# Patient Record
Sex: FEMALE | Race: OTHER | HISPANIC OR LATINO | Employment: UNEMPLOYED | URBAN - METROPOLITAN AREA
[De-identification: names, ages, dates, MRNs, and addresses within clinical notes are randomized per-mention and may not be internally consistent; named-entity substitution may affect disease eponyms.]

---

## 2022-03-23 ENCOUNTER — APPOINTMENT (EMERGENCY)
Dept: RADIOLOGY | Facility: HOSPITAL | Age: 67
End: 2022-03-23

## 2022-03-23 ENCOUNTER — APPOINTMENT (EMERGENCY)
Dept: CT IMAGING | Facility: HOSPITAL | Age: 67
End: 2022-03-23

## 2022-03-23 ENCOUNTER — HOSPITAL ENCOUNTER (EMERGENCY)
Facility: HOSPITAL | Age: 67
Discharge: HOME | End: 2022-03-24
Attending: EMERGENCY MEDICINE | Admitting: EMERGENCY MEDICINE

## 2022-03-23 VITALS
DIASTOLIC BLOOD PRESSURE: 84 MMHG | TEMPERATURE: 98.3 F | OXYGEN SATURATION: 99 % | SYSTOLIC BLOOD PRESSURE: 143 MMHG | WEIGHT: 174.38 LBS | HEART RATE: 57 BPM | RESPIRATION RATE: 16 BRPM

## 2022-03-23 DIAGNOSIS — M54.2 NECK PAIN: Primary | ICD-10-CM

## 2022-03-23 DIAGNOSIS — M54.9 BACK PAIN: ICD-10-CM

## 2022-03-23 DIAGNOSIS — I10 HYPERTENSION: ICD-10-CM

## 2022-03-23 LAB
ALBUMIN SERPL BCP-MCNC: 4.6 G/DL (ref 3–5.2)
ALP SERPL-CCNC: 119 U/L (ref 43–122)
ALT SERPL W P-5'-P-CCNC: 13 U/L
ANION GAP SERPL CALCULATED.3IONS-SCNC: 6 MMOL/L (ref 5–14)
AST SERPL W P-5'-P-CCNC: 27 U/L (ref 14–36)
BASOPHILS # BLD AUTO: 0 THOUSANDS/ΜL (ref 0–0.1)
BASOPHILS NFR BLD AUTO: 1 % (ref 0–1)
BILIRUB SERPL-MCNC: 0.48 MG/DL
BILIRUB UR QL STRIP: NEGATIVE
BUN SERPL-MCNC: 9 MG/DL (ref 5–25)
CALCIUM SERPL-MCNC: 9.2 MG/DL (ref 8.4–10.2)
CARDIAC TROPONIN I PNL SERPL HS: 3 NG/L
CHLORIDE SERPL-SCNC: 104 MMOL/L (ref 97–108)
CLARITY UR: CLEAR
CO2 SERPL-SCNC: 32 MMOL/L (ref 22–30)
COLOR UR: NORMAL
CREAT SERPL-MCNC: 0.8 MG/DL (ref 0.6–1.2)
EOSINOPHIL # BLD AUTO: 0.1 THOUSAND/ΜL (ref 0–0.4)
EOSINOPHIL NFR BLD AUTO: 3 % (ref 0–6)
ERYTHROCYTE [DISTWIDTH] IN BLOOD BY AUTOMATED COUNT: 13 %
GFR SERPL CREATININE-BSD FRML MDRD: 77 ML/MIN/1.73SQ M
GLUCOSE SERPL-MCNC: 124 MG/DL (ref 70–99)
GLUCOSE UR STRIP-MCNC: NEGATIVE MG/DL
HCT VFR BLD AUTO: 37.1 % (ref 36–46)
HGB BLD-MCNC: 12.6 G/DL (ref 12–16)
HGB UR QL STRIP.AUTO: NEGATIVE
KETONES UR STRIP-MCNC: NEGATIVE MG/DL
LEUKOCYTE ESTERASE UR QL STRIP: NEGATIVE
LYMPHOCYTES # BLD AUTO: 2.4 THOUSANDS/ΜL (ref 0.5–4)
LYMPHOCYTES NFR BLD AUTO: 51 % (ref 25–45)
MCH RBC QN AUTO: 30.4 PG (ref 26–34)
MCHC RBC AUTO-ENTMCNC: 34 G/DL (ref 31–36)
MCV RBC AUTO: 89 FL (ref 80–100)
MONOCYTES # BLD AUTO: 0.4 THOUSAND/ΜL (ref 0.2–0.9)
MONOCYTES NFR BLD AUTO: 8 % (ref 1–10)
NEUTROPHILS # BLD AUTO: 1.7 THOUSANDS/ΜL (ref 1.8–7.8)
NEUTS SEG NFR BLD AUTO: 37 % (ref 45–65)
NITRITE UR QL STRIP: NEGATIVE
NT-PROBNP SERPL-MCNC: 67 PG/ML (ref 0–299)
PH UR STRIP.AUTO: 7 [PH]
PLATELET # BLD AUTO: 387 THOUSANDS/UL (ref 150–450)
PMV BLD AUTO: 8.2 FL (ref 8.9–12.7)
POTASSIUM SERPL-SCNC: 3.7 MMOL/L (ref 3.6–5)
PROT SERPL-MCNC: 8.6 G/DL (ref 5.9–8.4)
PROT UR STRIP-MCNC: NEGATIVE MG/DL
RBC # BLD AUTO: 4.15 MILLION/UL (ref 4–5.2)
SODIUM SERPL-SCNC: 142 MMOL/L (ref 137–147)
SP GR UR STRIP.AUTO: 1.01 (ref 1–1.04)
UROBILINOGEN UA: NEGATIVE MG/DL
WBC # BLD AUTO: 4.7 THOUSAND/UL (ref 4.5–11)

## 2022-03-23 PROCEDURE — G1004 CDSM NDSC: HCPCS

## 2022-03-23 PROCEDURE — 81003 URINALYSIS AUTO W/O SCOPE: CPT

## 2022-03-23 PROCEDURE — 71045 X-RAY EXAM CHEST 1 VIEW: CPT

## 2022-03-23 PROCEDURE — 84484 ASSAY OF TROPONIN QUANT: CPT

## 2022-03-23 PROCEDURE — 70450 CT HEAD/BRAIN W/O DYE: CPT

## 2022-03-23 PROCEDURE — 96374 THER/PROPH/DIAG INJ IV PUSH: CPT

## 2022-03-23 PROCEDURE — 99284 EMERGENCY DEPT VISIT MOD MDM: CPT

## 2022-03-23 PROCEDURE — 93005 ELECTROCARDIOGRAM TRACING: CPT

## 2022-03-23 PROCEDURE — 83880 ASSAY OF NATRIURETIC PEPTIDE: CPT

## 2022-03-23 PROCEDURE — 36415 COLL VENOUS BLD VENIPUNCTURE: CPT

## 2022-03-23 PROCEDURE — 85025 COMPLETE CBC W/AUTO DIFF WBC: CPT

## 2022-03-23 PROCEDURE — 80053 COMPREHEN METABOLIC PANEL: CPT

## 2022-03-23 RX ORDER — KETOROLAC TROMETHAMINE 30 MG/ML
15 INJECTION, SOLUTION INTRAMUSCULAR; INTRAVENOUS ONCE
Status: COMPLETED | OUTPATIENT
Start: 2022-03-23 | End: 2022-03-23

## 2022-03-23 RX ADMIN — KETOROLAC TROMETHAMINE 15 MG: 30 INJECTION, SOLUTION INTRAMUSCULAR; INTRAVENOUS at 22:42

## 2022-03-24 LAB
ATRIAL RATE: 64 BPM
ATRIAL RATE: 67 BPM
P AXIS: 40 DEGREES
P AXIS: 60 DEGREES
PR INTERVAL: 160 MS
PR INTERVAL: 182 MS
QRS AXIS: -39 DEGREES
QRS AXIS: -41 DEGREES
QRSD INTERVAL: 82 MS
QRSD INTERVAL: 88 MS
QT INTERVAL: 416 MS
QT INTERVAL: 428 MS
QTC INTERVAL: 429 MS
QTC INTERVAL: 452 MS
T WAVE AXIS: 43 DEGREES
T WAVE AXIS: 56 DEGREES
VENTRICULAR RATE: 64 BPM
VENTRICULAR RATE: 67 BPM

## 2022-03-24 PROCEDURE — 93010 ELECTROCARDIOGRAM REPORT: CPT | Performed by: INTERNAL MEDICINE

## 2022-03-24 NOTE — ED NOTES
Patient resting quietly upon entering room   Reports that she is feeling better       Arabella Ramirez RN  03/23/22 1978

## 2022-03-24 NOTE — ED PROVIDER NOTES
History  Chief Complaint   Patient presents with    Neck Pain     Pt states (via ) that her neck and left arm started hurting 5 days ago  No known cause   Headache     Patient is a 60-year-old female with a past medical history of PVD and hypertension for 5 days of neck, left arm, and left flank pain  Patient is in no acute distress at this time  Patient denies chest pain, shortness of breath, nausea, vomiting, dysuria, or any other symptoms    Patient's right foot, appears to be black, most likely due to PVD  Patient and patient's daughter states that this is her normal      History provided by:  Patient   used: No    Neck Pain  Pain location:  L side  Pain severity:  Mild  Duration:  5 days  Associated symptoms: headaches    Associated symptoms: no bladder incontinence, no bowel incontinence, no chest pain, no fever, no leg pain, no paresis, no photophobia, no syncope, no visual change, no weakness and no weight loss    Headaches:     Severity:  Mild    Duration:  5 days    Timing:  Constant  Headache  Associated symptoms: back pain and neck pain    Associated symptoms: no dizziness, no ear pain, no fever, no photophobia, no syncope, no visual change and no weakness        None       Past Medical History:   Diagnosis Date    Hypertension     Stomach ulcer        Past Surgical History:   Procedure Laterality Date    GALLBLADDER SURGERY      HYSTERECTOMY      VARICOSE VEIN SURGERY         History reviewed  No pertinent family history  I have reviewed and agree with the history as documented  E-Cigarette/Vaping     E-Cigarette/Vaping Substances     Social History     Tobacco Use    Smoking status: Never Smoker    Smokeless tobacco: Never Used   Substance Use Topics    Alcohol use: Never    Drug use: Never       Review of Systems   Constitutional: Negative  Negative for activity change, appetite change, fever and weight loss  HENT: Negative    Negative for ear pain     Eyes: Negative  Negative for photophobia  Respiratory: Negative  Negative for chest tightness and shortness of breath  Cardiovascular: Negative  Negative for chest pain and syncope  Gastrointestinal: Negative  Negative for bowel incontinence  Genitourinary: Negative  Negative for bladder incontinence  Musculoskeletal: Positive for back pain and neck pain  Skin: Negative  Neurological: Positive for headaches  Negative for dizziness and weakness  Psychiatric/Behavioral: Negative  Physical Exam  Physical Exam  Vitals reviewed  Constitutional:       Appearance: Normal appearance  She is normal weight  HENT:      Head: Normocephalic and atraumatic  Right Ear: External ear normal       Left Ear: External ear normal       Nose: Nose normal    Eyes:      General:         Right eye: No discharge  Left eye: No discharge  Conjunctiva/sclera: Conjunctivae normal    Cardiovascular:      Rate and Rhythm: Normal rate  Pulses: Normal pulses  Pulmonary:      Effort: Pulmonary effort is normal    Abdominal:      Palpations: Abdomen is soft  Tenderness: There is no abdominal tenderness  There is left CVA tenderness  There is no right CVA tenderness or guarding  Musculoskeletal:         General: Normal range of motion  Cervical back: Normal range of motion  Skin:     General: Skin is warm and dry  Capillary Refill: Capillary refill takes less than 2 seconds  Neurological:      Mental Status: She is alert           Vital Signs  ED Triage Vitals [03/23/22 2104]   Temperature Pulse Respirations Blood Pressure SpO2   98 3 °F (36 8 °C) 75 18 (!) 204/102 99 %      Temp Source Heart Rate Source Patient Position - Orthostatic VS BP Location FiO2 (%)   Oral Monitor Sitting Left arm --      Pain Score       --           Vitals:    03/23/22 2104 03/23/22 2109   BP: (!) 204/102 (!) 178/97   Pulse: 75    Patient Position - Orthostatic VS: Sitting          Visual Acuity      ED Medications  Medications   ketorolac (TORADOL) injection 15 mg (15 mg Intravenous Given 3/23/22 2242)       Diagnostic Studies  Results Reviewed     Procedure Component Value Units Date/Time    UA w Reflex to Microscopic w Reflex to Culture [[id number]]  (Normal) Collected: 03/23/22 2214    Lab Status: Final result Specimen: Urine, Clean Catch Updated: 03/23/22 2230     Color, UA Straw     Clarity, UA Clear     Specific Gravity, UA 1 015     pH, UA 7 0     Leukocytes, UA Negative     Nitrite, UA Negative     Protein, UA Negative mg/dl      Glucose, UA Negative mg/dl      Ketones, UA Negative mg/dl      Bilirubin, UA Negative     Blood, UA Negative     UROBILINOGEN UA Negative mg/dL     NT-BNP PRO [[id number]]  (Normal) Collected: 03/23/22 2140    Lab Status: Final result Specimen: Blood from Arm, Left Updated: 03/23/22 2222     NT-proBNP 67 0 pg/mL     HS Troponin 0hr (reflex protocol) [[id number]]  (Normal) Collected: 03/23/22 2140    Lab Status: Final result Specimen: Blood from Arm, Left Updated: 03/23/22 2220     hs TnI 0hr 3 ng/L     Comprehensive metabolic panel [[id number]]  (Abnormal) Collected: 03/23/22 2140    Lab Status: Final result Specimen: Blood from Arm, Left Updated: 03/23/22 2215     Sodium 142 mmol/L      Potassium 3 7 mmol/L      Chloride 104 mmol/L      CO2 32 mmol/L      ANION GAP 6 mmol/L      BUN 9 mg/dL      Creatinine 0 80 mg/dL      Glucose 124 mg/dL      Calcium 9 2 mg/dL      AST 27 U/L      ALT 13 U/L      Alkaline Phosphatase 119 U/L      Total Protein 8 6 g/dL      Albumin 4 6 g/dL      Total Bilirubin 0 48 mg/dL      eGFR 77 ml/min/1 73sq m     Narrative:      Justin guidelines for Chronic Kidney Disease (CKD):     Stage 1 with normal or high GFR (GFR > 90 mL/min/1 73 square meters)    Stage 2 Mild CKD (GFR = 60-89 mL/min/1 73 square meters)    Stage 3A Moderate CKD (GFR = 45-59 mL/min/1 73 square meters)    Stage 3B Moderate CKD (GFR = 30-44 mL/min/1 73 square meters)    Stage 4 Severe CKD (GFR = 15-29 mL/min/1 73 square meters)    Stage 5 End Stage CKD (GFR <15 mL/min/1 73 square meters)  Note: GFR calculation is accurate only with a steady state creatinine    CBC and differential [[id number]]  (Abnormal) Collected: 03/23/22 2140    Lab Status: Final result Specimen: Blood from Arm, Left Updated: 03/23/22 2153     WBC 4 70 Thousand/uL      RBC 4 15 Million/uL      Hemoglobin 12 6 g/dL      Hematocrit 37 1 %      MCV 89 fL      MCH 30 4 pg      MCHC 34 0 g/dL      RDW 13 0 %      MPV 8 2 fL      Platelets 426 Thousands/uL      Neutrophils Relative 37 %      Lymphocytes Relative 51 %      Monocytes Relative 8 %      Eosinophils Relative 3 %      Basophils Relative 1 %      Neutrophils Absolute 1 70 Thousands/µL      Lymphocytes Absolute 2 40 Thousands/µL      Monocytes Absolute 0 40 Thousand/µL      Eosinophils Absolute 0 10 Thousand/µL      Basophils Absolute 0 00 Thousands/µL                  CT head without contrast    (Results Pending)   XR chest 1 view portable    (Results Pending)              Procedures  Procedures         ED Course  ED Course as of 03/23/22 2246   Wed Mar 23, 2022   2215 Creatinine: 0 80   2221 hs TnI 0hr: 3   2224 NT-proBNP: 67 0                  Stroke Assessment     Row Name 03/23/22 2207             NIH Stroke Scale    Interval Baseline      Level of Consciousness (1a ) 0      LOC Questions (1b ) 0      LOC Commands (1c ) 0      Best Gaze (2 ) 0      Visual (3 ) 0      Facial Palsy (4 ) 0      Motor Arm, Left (5a ) 0      Motor Arm, Right (5b ) 0      Motor Leg, Left (6a ) 0      Motor Leg, Right (6b ) 0      Limb Ataxia (7 ) 0      Sensory (8 ) 0      Best Language (9 ) 0      Dysarthria (10 ) 0      Extinction and Inattention (11 ) (Formerly Neglect) 0      Total 0                            SBIRT 22yo+      Most Recent Value   SBIRT (23 yo +)    In order to provide better care to our patients, we are screening all of our patients for alcohol and drug use  Would it be okay to ask you these screening questions? Yes Filed at: 03/23/2022 2126   Initial Alcohol Screen: US AUDIT-C     1  How often do you have a drink containing alcohol? 0 Filed at: 03/23/2022 2126   2  How many drinks containing alcohol do you have on a typical day you are drinking? 0 Filed at: 03/23/2022 2126   3b  FEMALE Any Age, or MALE 65+: How often do you have 4 or more drinks on one occassion? 0 Filed at: 03/23/2022 2126   Audit-C Score 0 Filed at: 03/23/2022 2126   BARBARA: How many times in the past year have you    Used an illegal drug or used a prescription medication for non-medical reasons? Never Filed at: 03/23/2022 2126                    MDM  Number of Diagnoses or Management Options  Back pain: new and does not require workup  Hypertension: established and worsening  Neck pain: new and does not require workup  Diagnosis management comments: Patient is in no acute distress this time  Troponin was less than 5, and pain has been going on for 5 days  No sign of UTI at this time  BNP is less than 500  Awaiting results of CT, signed off to minerva Hall    Counseling: I had a detailed discussion with the patient and/or guardian regarding: the historical points, exam findings, and any diagnostic results supporting the discharge diagnosis, lab results, radiology results, discharge instructions reviewed with patient and/or family/caregiver and understanding was verbalized   Instructions given to return to the emergency department if symptoms worsen or persist, or if there are any questions or concerns that arise at home       All labs reviewed and utilized in the medical decision making process     All radiology studies independently viewed by me and interpreted by the radiologist     Portions of the record may have been created with voice recognition software   Occasional wrong word or "sound a like" substitutions may have occurred due to the inherent limitations of voice recognition software   Read the chart carefully and recognize, using context, where substitutions have occurred  Amount and/or Complexity of Data Reviewed  Clinical lab tests: ordered and reviewed  Tests in the radiology section of CPT®: ordered    Risk of Complications, Morbidity, and/or Mortality  Presenting problems: low  Diagnostic procedures: minimal  Management options: minimal    Patient Progress  Patient progress: stable      Disposition  Final diagnoses:   Neck pain   Back pain   Hypertension     Time reflects when diagnosis was documented in both MDM as applicable and the Disposition within this note     Time User Action Codes Description Comment    3/23/2022 10:38 PM Trinidad Furbish Add [M54 2] Neck pain     3/23/2022 10:38 PM Trinidad Furbish Add [M54 9] Back pain     3/23/2022 10:39 PM Trinidad Furbish Add [I10] Hypertension       ED Disposition     None      Follow-up Information     Follow up With Specialties Details Why Contact Info Additional Information    [facility] Emergency Department Emergency Medicine  As needed, If symptoms worsen [address] [facility] [id number] [address] [facility] Emergency Department          Patient's Medications    No medications on file       No discharge procedures on file      PDMP Review     None          ED Provider  Electronically Signed by           Marvin Castaneda PA-C  03/23/22 3203

## 2022-03-25 NOTE — ED ATTENDING ATTESTATION
I was the attending physician on duty at the time the patient visited the emergency department  The patient was evaluated and dispositioned by the APC  I was personally available for consultation  I am administratively signing the chart after the fact      Delores Davis MD

## 2023-09-16 ENCOUNTER — HOSPITAL ENCOUNTER (EMERGENCY)
Facility: HOSPITAL | Age: 68
Discharge: HOME | End: 2023-09-16
Attending: EMERGENCY MEDICINE

## 2023-09-16 VITALS
TEMPERATURE: 98.1 F | RESPIRATION RATE: 20 BRPM | HEART RATE: 75 BPM | SYSTOLIC BLOOD PRESSURE: 156 MMHG | OXYGEN SATURATION: 99 % | DIASTOLIC BLOOD PRESSURE: 89 MMHG | WEIGHT: 175.7 LBS

## 2023-09-16 DIAGNOSIS — L97.909 VENOUS STASIS ULCER (HCC): Primary | ICD-10-CM

## 2023-09-16 DIAGNOSIS — I83.009 VENOUS STASIS ULCER (HCC): Primary | ICD-10-CM

## 2023-09-16 PROCEDURE — 99284 EMERGENCY DEPT VISIT MOD MDM: CPT

## 2023-09-16 PROCEDURE — 96372 THER/PROPH/DIAG INJ SC/IM: CPT

## 2023-09-16 PROCEDURE — 99282 EMERGENCY DEPT VISIT SF MDM: CPT

## 2023-09-16 RX ORDER — AMLODIPINE AND VALSARTAN 10; 160 MG/1; MG/1
1 TABLET ORAL DAILY
COMMUNITY
Start: 2022-12-02 | End: 2023-12-02

## 2023-09-16 RX ORDER — KETOROLAC TROMETHAMINE 30 MG/ML
15 INJECTION, SOLUTION INTRAMUSCULAR; INTRAVENOUS ONCE
Status: COMPLETED | OUTPATIENT
Start: 2023-09-16 | End: 2023-09-16

## 2023-09-16 RX ORDER — CEFUROXIME AXETIL 500 MG/1
500 TABLET ORAL EVERY 12 HOURS SCHEDULED
Qty: 14 TABLET | Refills: 0 | Status: SHIPPED | OUTPATIENT
Start: 2023-09-16 | End: 2023-09-23

## 2023-09-16 RX ORDER — LIDOCAINE 50 MG/G
1 PATCH TOPICAL ONCE
Status: DISCONTINUED | OUTPATIENT
Start: 2023-09-16 | End: 2023-09-16 | Stop reason: HOSPADM

## 2023-09-16 RX ORDER — PANTOPRAZOLE SODIUM 20 MG/1
20 TABLET, DELAYED RELEASE ORAL DAILY
COMMUNITY
Start: 2022-12-02 | End: 2023-12-02

## 2023-09-16 RX ADMIN — KETOROLAC TROMETHAMINE 15 MG: 30 INJECTION, SOLUTION INTRAMUSCULAR; INTRAVENOUS at 16:20

## 2023-09-16 RX ADMIN — LIDOCAINE 1 PATCH: 700 PATCH TOPICAL at 16:20

## 2023-12-19 ENCOUNTER — HOSPITAL ENCOUNTER (EMERGENCY)
Facility: HOSPITAL | Age: 68
Discharge: HOME | End: 2023-12-19
Attending: INTERNAL MEDICINE
Payer: COMMERCIAL

## 2023-12-19 VITALS
RESPIRATION RATE: 16 BRPM | HEART RATE: 82 BPM | WEIGHT: 179.7 LBS | DIASTOLIC BLOOD PRESSURE: 86 MMHG | OXYGEN SATURATION: 97 % | TEMPERATURE: 98.2 F | SYSTOLIC BLOOD PRESSURE: 160 MMHG

## 2023-12-19 DIAGNOSIS — J06.9 VIRAL URI WITH COUGH: Primary | ICD-10-CM

## 2023-12-19 PROCEDURE — 99283 EMERGENCY DEPT VISIT LOW MDM: CPT

## 2023-12-19 PROCEDURE — 87636 SARSCOV2 & INF A&B AMP PRB: CPT

## 2023-12-19 RX ORDER — GUAIFENESIN 600 MG/1
1200 TABLET, EXTENDED RELEASE ORAL EVERY 12 HOURS SCHEDULED
Qty: 20 TABLET | Refills: 0 | Status: SHIPPED | OUTPATIENT
Start: 2023-12-19 | End: 2023-12-24

## 2023-12-19 RX ORDER — SENNOSIDES 8.6 MG
650 CAPSULE ORAL EVERY 8 HOURS PRN
Qty: 30 TABLET | Refills: 0 | Status: SHIPPED | OUTPATIENT
Start: 2023-12-19

## 2023-12-19 NOTE — ED PROVIDER NOTES
Chief Complaint   Patient presents with    Cough     Cough, congestion, headache, sore throat for a few days       HPI: Patient is a 68 y.o. female with PMH of HTN who presents with 2 days of cough, headache, sore throat, and congestion  which the patient describes at moderate The patient has had contact with people with similar symptoms.  The patient has not taken any medication.  Denies fever, chills, chest pain, shortness of breath, hemoptysis, vision change, numbness, weakness, seizures, difficulty swallowing, voice change, drooling, neck pain or stiffness, rashes, leg swelling.    No Known Allergies    Past Medical History:   Diagnosis Date    Circulation problem     Hypertension       Past Surgical History:   Procedure Laterality Date    CHOLECYSTECTOMY      HYSTERECTOMY      LEG SURGERY      states due to poor circulation     Social History     Tobacco Use    Smoking status: Never     Passive exposure: Never    Smokeless tobacco: Never   Vaping Use    Vaping status: Never Used   Substance Use Topics    Alcohol use: Never    Drug use: Never       Nursing notes reviewed  Physical Exam:  ED Triage Vitals [12/19/23 1250]   Temperature Pulse Respirations Blood Pressure SpO2   98.2 °F (36.8 °C) 82 16 160/86 97 %      Temp Source Heart Rate Source Patient Position - Orthostatic VS BP Location FiO2 (%)   Oral Monitor Sitting Left arm --      Pain Score       --           ROS: Positive for cough, headache, sore throat, congestion, the remainder of a 10 organ system ROS was otherwise unremarkable.  General: awake, alert, no acute distress  Head: normocephalic, atraumatic  Eyes: no scleral icterus, no discharge, PERRL, EOMI  Ears: external ears normal, hearing grossly intact  Nose: external exam grossly normal, negative nasal discharge, + congestion   Mouth:  Erythematous oropharynx without swelling or exudate.  Uvula midline.  No tonsillar swelling, exudates or abscesses. Patient maintaining airway and secretions. No  stridor . No brawniness under tongue.   Neck: symmetric, No JVD noted, trachea midline, no anterior cervical lymphadenopathy and a full range of motion of neck without pain  Pulmonary: Patient in no respiratory distress, speaking in full sentences, managing oral secretions without difficulty, no accessory muscle use, retractions, or belly breathing noted, no adventitious lung sounds auscultated bilaterally.  Cardiovascular: appears well perfused, RRR, no murmurs, no leg swelling   Abdomen: no distention noted, no tenderness   Musculoskeletal: no deformities noted, tone normal  Neuro: grossly non-focal  Psych: mood and affect appropriate  Skin: warm, dry, no rash    DDX includes but not limited to rhinitis, sinusitis, pertussis, bronchitis, PNA, influenza, Covid-19, allergies. The patient is stable and has a history and physical exam consistent with a viral illness. COVID19/Influenza testing has been performed.   No respiratory distress. Afebrile. No focal lung findings, low clinical suspicion for PNA. Low clinical suspicion for RPA/PTA given exam findings. centor score of 0, do not feel strep testing is necessary at this time. No overt indications for antibiotics.  I considered the patient's other medical conditions as applicable/noted above in my medical decision making.  The patient is stable upon discharge. The plan is for supportive care at home.    The patient (and any family present) verbalized understanding of the discharge instructions and warnings that would necessitate return to the Emergency Department.  All questions were answered prior to discharge.    Medications - No data to display  Final diagnoses:   Viral URI with cough     Time reflects when diagnosis was documented in both MDM as applicable and the Disposition within this note       Time User Action Codes Description Comment    12/19/2023  1:45 PM Soha Woods [J06.9] Viral URI with cough           ED Disposition       ED Disposition    Discharge    Condition   Stable    Date/Time   Tue Dec 19, 2023  1:45 PM    Comment   Julia Gannsabrina discharge to home/self care.                   Follow-up Information       Follow up With Specialties Details Why Contact Info Additional Information    [facility] Medicine Schedule an appointment as soon as possible for a visit  For follow up regarding your symptoms [address] [place] [address], [address] [address] [phone] [facility], [address] [place] [address], [place] [address], [place], [place], [address] [phone]          Discharge Medication List as of 12/19/2023  1:51 PM        START taking these medications    Details   acetaminophen (TYLENOL) 650 mg CR tablet Take 1 tablet (650 mg total) by mouth every 8 (eight) hours as needed for mild pain, Starting Tue 12/19/2023, Normal      guaiFENesin (MUCINEX) 600 mg 12 hr tablet Take 2 tablets (1,200 mg total) by mouth every 12 (twelve) hours for 5 days, Starting Tue 12/19/2023, Until Sun 12/24/2023, Normal      menthol-cetylpyridinium (CEPACOL) 3 MG lozenge Take 1 lozenge (3 mg total) by mouth as needed for sore throat, Starting Tue 12/19/2023, Normal      sodium chloride (OCEAN) 0.65 % nasal spray 1 spray into each nostril as needed for congestion (as needed for congestion), Starting Tue 12/19/2023, Until Wed 12/18/2024 at 2359, Normal           No discharge procedures on file.    Electronically Signed by       Soha Woods PA-C  12/19/23 0713

## 2023-12-19 NOTE — DISCHARGE INSTRUCTIONS
Follow up with PCP. We will call in 1-2 business days with any positive results from the covid-19/influenza test, all results will be available on your mychart. Return to ED for new or worsening symptoms as discussed.

## 2023-12-20 LAB
FLUAV RNA RESP QL NAA+PROBE: NEGATIVE
FLUBV RNA RESP QL NAA+PROBE: NEGATIVE
SARS-COV-2 RNA RESP QL NAA+PROBE: NEGATIVE

## 2023-12-28 ENCOUNTER — HOSPITAL ENCOUNTER (EMERGENCY)
Facility: HOSPITAL | Age: 68
Discharge: HOME | End: 2023-12-28
Attending: EMERGENCY MEDICINE
Payer: COMMERCIAL

## 2023-12-28 ENCOUNTER — APPOINTMENT (EMERGENCY)
Dept: RADIOLOGY | Facility: HOSPITAL | Age: 68
End: 2023-12-28
Payer: COMMERCIAL

## 2023-12-28 VITALS
WEIGHT: 178.9 LBS | RESPIRATION RATE: 16 BRPM | SYSTOLIC BLOOD PRESSURE: 120 MMHG | HEART RATE: 65 BPM | OXYGEN SATURATION: 98 % | TEMPERATURE: 98.7 F | DIASTOLIC BLOOD PRESSURE: 65 MMHG

## 2023-12-28 DIAGNOSIS — M25.539 WRIST PAIN: Primary | ICD-10-CM

## 2023-12-28 PROCEDURE — 99283 EMERGENCY DEPT VISIT LOW MDM: CPT

## 2023-12-28 PROCEDURE — 99284 EMERGENCY DEPT VISIT MOD MDM: CPT

## 2023-12-28 PROCEDURE — 73110 X-RAY EXAM OF WRIST: CPT

## 2023-12-28 RX ORDER — COLCHICINE 0.6 MG/1
1.2 TABLET ORAL ONCE
Status: COMPLETED | OUTPATIENT
Start: 2023-12-28 | End: 2023-12-28

## 2023-12-28 RX ORDER — COLCHICINE 0.6 MG/1
0.6 TABLET ORAL ONCE
Status: COMPLETED | OUTPATIENT
Start: 2023-12-28 | End: 2023-12-28

## 2023-12-28 RX ORDER — LIDOCAINE HYDROCHLORIDE 10 MG/ML
10 INJECTION, SOLUTION EPIDURAL; INFILTRATION; INTRACAUDAL; PERINEURAL ONCE
Status: COMPLETED | OUTPATIENT
Start: 2023-12-28 | End: 2023-12-28

## 2023-12-28 RX ORDER — COLCHICINE 0.6 MG/1
0.6 TABLET ORAL AS NEEDED
Qty: 3 TABLET | Refills: 0 | Status: SHIPPED | OUTPATIENT
Start: 2023-12-28 | End: 2024-01-01

## 2023-12-28 RX ADMIN — COLCHICINE 1.2 MG: 0.6 TABLET ORAL at 15:38

## 2023-12-28 RX ADMIN — COLCHICINE 0.6 MG: 0.6 TABLET ORAL at 16:40

## 2023-12-28 RX ADMIN — LIDOCAINE HYDROCHLORIDE 10 ML: 10 INJECTION, SOLUTION EPIDURAL; INFILTRATION; INTRACAUDAL; PERINEURAL at 14:50

## 2023-12-28 NOTE — ED NOTES
Pt stating that her wrist is beginning to hurt again. Provider notified of the same     Aleksandr Carrion RN  12/28/23 2794

## 2023-12-28 NOTE — Clinical Note
Angelica Tate was seen and treated in our emergency department on 12/28/2023.                Diagnosis:     Angelica  .    She may return on this date:          If you have any questions or concerns, please don't hesitate to call.      Radhames Mendieta MD    ______________________________           _______________          _______________  Hospital Representative                              Date                                Time

## 2023-12-29 NOTE — ED ATTENDING ATTESTATION
12/28/2023  IRadhames MD, saw and evaluated the patient. I have discussed the patient with the resident/non-physician practitioner and agree with the resident's/non-physician practitioner's findings, Plan of Care, and MDM as documented in the resident's/non-physician practitioner's note, except where noted. All available labs and Radiology studies were reviewed.  I was present for key portions of any procedure(s) performed by the resident/non-physician practitioner and I was immediately available to provide assistance.       At this point I agree with the current assessment done in the Emergency Department.  I have conducted an independent evaluation of this patient a history and physical is as follows:    ED Course     Patient is a 68-year-old female presenting with right wrist swelling as well as pain.  States that the symptoms started about 8 days ago.  Prior to that patient had no symptoms.  Due to the significant swelling and pain patient is unable to mobilize her right wrist.  Denies any difficulty mobilizing her fingers and also no sensation changes.  Never had symptoms similar to this in the past.  Denies any prior fracture or dislocation.  Also reports no trauma.  Patient reports no elbow or shoulder pain.  Has not taken any medication.  Patient denies any systemic symptoms including fever, chills, nausea, vomiting, diarrhea.  No known history of diabetes.    Exam: Patient with swelling of the right wrist that is tender to touch.  No erythema.  Patient has full range of motion her elbow as well as her shoulder.  Also full range of motion in her fingers.  However limited range of motion in her right wrist due to significant pain.  Neurovascularly intact.  No other abnormal exam findings    Plan: Patient presents with right wrist swelling  Arthrocentesis attempted to ascertain possibility of gout versus septic arthritis.  However likelihood of septic arthritis low due to patient's history as well as exam  finding.  Arthrocentesis was unsuccessful and unable to drain significant amount of joint fluid.  However after the injection of lidocaine into the joint space patient endorses significant relief of her symptoms.  Patient to be treated as possible gout and strict return precautions were provided.    Critical Care Time  Arthrocentesis    Date/Time: 12/28/2023 4:31 PM    Performed by: Radhames Mendieta MD  Authorized by: Radhames Mendieta MD  Universal Protocol:  Consent: Verbal consent obtained.  Consent given by: patient  Patient understanding: patient states understanding of the procedure being performed  Patient consent: the patient's understanding of the procedure matches consent given  Patient identity confirmed: hospital-assigned identification number    Location:  ED  Indications:  Joint swelling  Body area:  Wrist  Joint:  Right wrist  Local anesthesia used?: Yes    Anesthesia:  Local infiltration  Local anesthetic:  Lidocaine 1% without epinephrine  Needle size:  18 G  Aspirate amount (ml):  0  Patient tolerance:  Patient tolerated the procedure well with no immediate complications   Procedure was unsuccessful in obtaining synovial fluid

## 2023-12-29 NOTE — ED PROVIDER NOTES
History  Chief Complaint   Patient presents with    Hand Swelling     Right hand edematous since this AM. It was painful last night and when she woke up it was painful and swollen. Pt has limited ROM wrist to fingers. Pt takes a blood thinner from . Has a PCP here and travels back and forth from  to her       Patient is a 68-year-old female coming in for evaluation of right wrist swelling that started this morning.  Patient tells me that the pain started approximately 8 days ago, and has gotten worse since then, though no swelling until now.  Patient denies any trauma, is on clopidogrel.  Normal sensation in the fingers, does not want to move the wrist due to pain      Wrist Pain  Associated symptoms: no abdominal pain, no cough, no fatigue, no fever, no myalgias, no nausea, no shortness of breath and no vomiting        Prior to Admission Medications   Prescriptions Last Dose Informant Patient Reported? Taking?   acetaminophen (TYLENOL) 650 mg CR tablet   No No   Sig: Take 1 tablet (650 mg total) by mouth every 8 (eight) hours as needed for mild pain   amLODIPine-valsartan (EXFORGE)  MG per tablet   Yes No   Sig: Take 1 tablet by mouth daily   menthol-cetylpyridinium (CEPACOL) 3 MG lozenge   No No   Sig: Take 1 lozenge (3 mg total) by mouth as needed for sore throat   pantoprazole (PROTONIX) 20 mg tablet   Yes No   Sig: Take 20 mg by mouth daily   sodium chloride (OCEAN) 0.65 % nasal spray   No No   Si spray into each nostril as needed for congestion (as needed for congestion)      Facility-Administered Medications: None       Past Medical History:   Diagnosis Date    Circulation problem     Hypertension     Stomach ulcer        Past Surgical History:   Procedure Laterality Date    CHOLECYSTECTOMY      GALLBLADDER SURGERY      HYSTERECTOMY      LEG SURGERY      states due to poor circulation    VARICOSE VEIN SURGERY         History reviewed. No pertinent family history.  I have reviewed and agree with  the history as documented.    E-Cigarette/Vaping    E-Cigarette Use Never User      E-Cigarette/Vaping Substances     Social History     Tobacco Use    Smoking status: Never     Passive exposure: Never    Smokeless tobacco: Never   Vaping Use    Vaping status: Never Used   Substance Use Topics    Alcohol use: Never    Drug use: Never       Review of Systems   Constitutional:  Negative for fatigue and fever.   Respiratory:  Negative for cough and shortness of breath.    Gastrointestinal:  Negative for abdominal pain, nausea and vomiting.   Musculoskeletal:  Positive for arthralgias and joint swelling. Negative for back pain, gait problem and myalgias.       Physical Exam  Physical Exam  Vitals reviewed.   Constitutional:       Appearance: Normal appearance. She is normal weight.   HENT:      Head: Normocephalic and atraumatic.      Right Ear: External ear normal.      Left Ear: External ear normal.      Nose: Nose normal.   Eyes:      Conjunctiva/sclera: Conjunctivae normal.   Cardiovascular:      Rate and Rhythm: Normal rate.   Pulmonary:      Effort: Pulmonary effort is normal.   Musculoskeletal:         General: Swelling and tenderness present. Normal range of motion.      Cervical back: Normal range of motion.      Comments: Swelling and tenderness to the dorsal aspect of the right wrist.  Normal sensation, capillary refill less than 2 seconds.  Normal range of motion of the fingers, patient has reduced range of motion secondary to pain   Skin:     General: Skin is warm and dry.   Neurological:      Mental Status: She is alert.         Vital Signs  ED Triage Vitals   Temperature Pulse Respirations Blood Pressure SpO2   12/28/23 1307 12/28/23 1307 12/28/23 1307 12/28/23 1307 12/28/23 1307   98.7 °F (37.1 °C) 74 16 137/73 96 %      Temp Source Heart Rate Source Patient Position - Orthostatic VS BP Location FiO2 (%)   12/28/23 1307 12/28/23 1307 12/28/23 1307 12/28/23 1307 --   Tympanic Monitor Sitting Left arm        Pain Score       12/28/23 1318       10 - Worst Possible Pain           Vitals:    12/28/23 1307 12/28/23 1647   BP: 137/73 120/65   Pulse: 74 65   Patient Position - Orthostatic VS: Sitting Lying         Visual Acuity      ED Medications  Medications   lidocaine (PF) (XYLOCAINE-MPF) 1 % injection 10 mL (10 mL Infiltration Given by Other 12/28/23 1450)   colchicine (COLCRYS) tablet 0.6 mg (0.6 mg Oral Given 12/28/23 1640)   colchicine (COLCRYS) tablet 1.2 mg (1.2 mg Oral Given 12/28/23 1538)       Diagnostic Studies  Results Reviewed       None                   XR wrist 3+ views RIGHT   ED Interpretation by Kunal Astorga PA-C (12/28 1514)   No obvious osseus abnormalities                   Procedures  Arthrocentesis    Date/Time: 12/28/2023 8:26 PM    Performed by: Kunal Astorga PA-C  Authorized by: Kunal Astorga PA-C  Universal Protocol:  Procedure performed by: (Dr. Radhames Mendieta)  Consent: Verbal consent obtained.  Risks and benefits: risks, benefits and alternatives were discussed  Consent given by: patient  Patient understanding: patient states understanding of the procedure being performed  Patient identity confirmed: verbally with patient    Location:  ED  Indications:  Joint swelling and possible septic joint  Body area:  Wrist  Joint:  Right wrist  Local anesthesia used?: Yes    Anesthesia:  Local infiltration  Local anesthetic:  Lidocaine 1% without epinephrine  Anesthetic total (ml):  6  Aspirate amount (ml):  0.5  Aspirate:  Bloody   I made 2 attempts, unsuccessfully.  Attending made several attempts unsuccessful.  See attendings attestation for further information           ED Course                               SBIRT 20yo+      Flowsheet Row Most Recent Value   Initial Alcohol Screen: US AUDIT-C     1. How often do you have a drink containing alcohol? 0 Filed at: 12/28/2023 1315   2. How many drinks containing alcohol do you have on a typical day you are drinking?  0 Filed at: 12/28/2023  1315   3a. Male UNDER 65: How often do you have five or more drinks on one occasion? 0 Filed at: 12/28/2023 1315   3b. FEMALE Any Age, or MALE 65+: How often do you have 4 or more drinks on one occassion? 0 Filed at: 12/28/2023 1315   Audit-C Score 0 Filed at: 12/28/2023 1315   BARBARA: How many times in the past year have you...    Used an illegal drug or used a prescription medication for non-medical reasons? Never Filed at: 12/28/2023 1315                      Medical Decision Making  Patient is a 68-year-old female coming in for evaluation of right wrist pain and swelling.  Atraumatic.  X-ray shows no sign of acute osseous abnormality.  Case was discussed with attending, decision was made to attempt arthrocentesis for concern for septic arthritis versus gout.  Based on patient's presentation, as well as no known trauma, and patient was able to more freely move wrist after lidocaine injection, this is more likely to be gout.  Started on colchicine, and recommended follow-up with PCP    Amount and/or Complexity of Data Reviewed  Radiology: ordered and independent interpretation performed.    Risk  Prescription drug management.             Disposition  Final diagnoses:   Wrist pain     Time reflects when diagnosis was documented in both MDM as applicable and the Disposition within this note       Time User Action Codes Description Comment    12/28/2023  3:55 PM Kunal Astorga Add [M25.539] Wrist pain           ED Disposition       ED Disposition   Discharge    Condition   Stable    Date/Time   Thu Dec 28, 2023 1636    Comment   Angelica Tate discharge to home/self care.                   Follow-up Information       Follow up With Specialties Details Why Contact Info    Jovon Apple MD Orthopedic Surgery, Hand Surgery   [address] [place] [id number]  [phone]              Discharge Medication List as of 12/28/2023  4:36 PM        START taking these medications    Details   colchicine  (COLCRYS) 0.6 mg tablet Take 1 tablet (0.6 mg total) by mouth as needed for muscle/joint pain for up to 4 days Can take 2 tablets initially and then 1 tablet one hour later if needed for pain., Starting Thu 12/28/2023, Until Mon 1/1/2024 at 2359, Print           CONTINUE these medications which have NOT CHANGED    Details   acetaminophen (TYLENOL) 650 mg CR tablet Take 1 tablet (650 mg total) by mouth every 8 (eight) hours as needed for mild pain, Starting Tue 12/19/2023, Normal      amLODIPine-valsartan (EXFORGE)  MG per tablet Take 1 tablet by mouth daily, Starting Fri 12/2/2022, Until Sat 12/2/2023, Historical Med      menthol-cetylpyridinium (CEPACOL) 3 MG lozenge Take 1 lozenge (3 mg total) by mouth as needed for sore throat, Starting Tue 12/19/2023, Normal      pantoprazole (PROTONIX) 20 mg tablet Take 20 mg by mouth daily, Starting Fri 12/2/2022, Until Sat 12/2/2023, Historical Med      sodium chloride (OCEAN) 0.65 % nasal spray 1 spray into each nostril as needed for congestion (as needed for congestion), Starting Tue 12/19/2023, Until Wed 12/18/2024 at 2359, Normal             No discharge procedures on file.    PDMP Review       None            ED Provider  Electronically Signed by             Kunal Astorga PA-C  12/28/23 2028

## 2023-12-31 NOTE — RESULT ENCOUNTER NOTE
Patient and grandchild called the phone number listed.  No answer at this time.  Left message on machine for call back to ER.

## 2023-12-31 NOTE — RESULT ENCOUNTER NOTE
Patient's grandmother returned call to the emergency department.  Name and date of birth uses positive patient identifiers.  Discussed test results.  Will follow-up with PCP or orthopedics if symptoms persist.

## 2024-01-17 ENCOUNTER — TELEPHONE (OUTPATIENT)
Age: 69
End: 2024-01-17

## 2024-01-17 NOTE — TELEPHONE ENCOUNTER
Samantha from [facility] vascular called to speak w/ aimee, she stated aieme was speaking w/ her co worker laurie earlier.  Please call samantha back, her direct line is [phone]

## 2024-01-18 NOTE — TELEPHONE ENCOUNTER
"(Delayed entry)    Rec'd call yesterday afternoon requesting appt for foot growth. Providers not was not yet finished - unable to tell me what type of growth: mole, discolored ..... Requested that she return call when she had needed information. Explained scheduling parameters and that documentation would need to be faxed when available.    Also suggested - depending on \"growth\" podiatry might be a better/quicker option for patient.      Returned call today and spoke to Anita. They are going to schedule patient with Podiatry. [facility]'s Podiatry phone number was provided.  "

## 2024-07-09 ENCOUNTER — OFFICE VISIT (OUTPATIENT)
Dept: PODIATRY | Facility: CLINIC | Age: 69
End: 2024-07-09
Payer: COMMERCIAL

## 2024-07-09 DIAGNOSIS — L84 CALLUS: ICD-10-CM

## 2024-07-09 DIAGNOSIS — I87.2 VENOUS INSUFFICIENCY: ICD-10-CM

## 2024-07-09 DIAGNOSIS — L85.3 XEROSIS CUTIS: Primary | ICD-10-CM

## 2024-07-09 PROCEDURE — 99203 OFFICE O/P NEW LOW 30 MIN: CPT | Performed by: PODIATRIST

## 2024-07-09 RX ORDER — AMMONIUM LACTATE 12 G/100G
CREAM TOPICAL AS NEEDED
Qty: 140 G | Refills: 5 | Status: SHIPPED | OUTPATIENT
Start: 2024-07-09

## 2024-07-09 NOTE — PROGRESS NOTES
Ambulatory Visit  Name: Angelica Tate      : 1955      MRN: [id number]  Encounter Provider: Bernard Vasquez DPM  Encounter Date: 2024   Encounter department: [facility] [place]    Assessment & Plan   1. Xerosis cutis  -     ammonium lactate (LAC-HYDRIN) 12 % cream; Apply topically as needed for dry skin  2. Callus  -     ammonium lactate (LAC-HYDRIN) 12 % cream; Apply topically as needed for dry skin  3. Venous insufficiency    Diagnosis and options discussed with patient  Patient agreeable to the plan as stated below    Skin condition is poor, dry. Likely secondary to her known circulation issues, venous insufficiency    Lac-Hydrin can soften the xerotic skin.     Padding in shoes.       History of Present Illness     Angelica Tate is a 68 y.o. female who presents with a chronic growth on her right foot. IT makes the skin thick. She thinks in hermilo rice she had a skin biopsy but it was negative. She has scarring on her legs from bad circulation.      USED    Review of Systems  As stated in HPI, otherwise normal    Medical History Reviewed by provider this encounter:  Tobacco  Allergies  Meds  Problems  Med Hx  Surg Hx  Fam Hx      Objective     LMP  (LMP Unknown)     Physical Exam  Vitals reviewed.   Cardiovascular:      Pulses:           Dorsalis pedis pulses are 2+ on the right side and 2+ on the left side.        Posterior tibial pulses are 1+ on the right side and 1+ on the left side.   Pulmonary:      Effort: No respiratory distress.   Musculoskeletal:      Right foot: Deformity (large hyperkeratosis plantar right arch) present.      Left foot: Deformity (IPK left forefoot) present.   Feet:      Right foot:      Skin integrity: No ulcer.   Skin:     Findings: Rash (hemosiderin deposition and scarring RLE, no open wound) present.   Neurological:      Mental Status: She is alert.       Administrative Statements

## 2024-07-19 ENCOUNTER — TELEPHONE (OUTPATIENT)
Age: 69
End: 2024-07-19

## 2024-07-19 NOTE — TELEPHONE ENCOUNTER
Patient called to see if the ammonium lactate was sent to the pharmacy. Advised it has been sent she will contact the pharmacy.

## 2025-03-22 ENCOUNTER — HOSPITAL ENCOUNTER (EMERGENCY)
Facility: HOSPITAL | Age: 70
Discharge: HOME | End: 2025-03-22
Attending: EMERGENCY MEDICINE | Admitting: EMERGENCY MEDICINE
Payer: COMMERCIAL

## 2025-03-22 VITALS
SYSTOLIC BLOOD PRESSURE: 124 MMHG | HEART RATE: 87 BPM | RESPIRATION RATE: 16 BRPM | OXYGEN SATURATION: 98 % | TEMPERATURE: 97.4 F | DIASTOLIC BLOOD PRESSURE: 65 MMHG | WEIGHT: 182.54 LBS

## 2025-03-22 DIAGNOSIS — M54.50 ACUTE EXACERBATION OF CHRONIC LOW BACK PAIN: ICD-10-CM

## 2025-03-22 DIAGNOSIS — M25.50 JOINT PAIN: Primary | ICD-10-CM

## 2025-03-22 DIAGNOSIS — G89.29 ACUTE EXACERBATION OF CHRONIC LOW BACK PAIN: ICD-10-CM

## 2025-03-22 PROCEDURE — 99282 EMERGENCY DEPT VISIT SF MDM: CPT

## 2025-03-22 PROCEDURE — 99284 EMERGENCY DEPT VISIT MOD MDM: CPT | Performed by: EMERGENCY MEDICINE

## 2025-03-22 RX ORDER — ACETAMINOPHEN 325 MG/1
975 TABLET ORAL ONCE
Status: COMPLETED | OUTPATIENT
Start: 2025-03-22 | End: 2025-03-22

## 2025-03-22 RX ORDER — IBUPROFEN 600 MG/1
600 TABLET, FILM COATED ORAL ONCE
Status: COMPLETED | OUTPATIENT
Start: 2025-03-22 | End: 2025-03-22

## 2025-03-22 RX ORDER — LIDOCAINE 50 MG/G
1 PATCH TOPICAL ONCE
Status: DISCONTINUED | OUTPATIENT
Start: 2025-03-22 | End: 2025-03-22 | Stop reason: HOSPADM

## 2025-03-22 RX ADMIN — LIDOCAINE 5% 1 PATCH: 700 PATCH TOPICAL at 13:58

## 2025-03-22 RX ADMIN — IBUPROFEN 600 MG: 600 TABLET, FILM COATED ORAL at 13:58

## 2025-03-22 RX ADMIN — ACETAMINOPHEN 975 MG: 325 TABLET, FILM COATED ORAL at 13:58

## 2025-03-22 NOTE — ED PROVIDER NOTES
Time reflects when diagnosis was documented in both MDM as applicable and the Disposition within this note       Time User Action Codes Description Comment    3/22/2025  2:32 PM Radhames Mendieta Add [M25.50] Joint pain     3/22/2025  2:32 PM Radhames Mendieta Add [M54.50,  G89.29] Acute exacerbation of chronic low back pain           ED Disposition       ED Disposition   Discharge    Condition   Stable    Date/Time   Sat Mar 22, 2025  2:32 PM    Comment   Angelica Tate discharge to home/self care.                   Assessment & Plan       Medical Decision Making  69 year old female with back pain, bilateral knee and elbow pain   Ongoing for several years and acutely worsened the past couple months   No concerns for fracture with absence of trauma   Patient without any red flag symptoms concerning for cauda equina, conus medullaris, epidrual abscess,s mass, hematoma   Patient most likely suffering from arthritis and also acute on chronic back pain.   Will give multimodal analgesia and reassess  If symptoms worsens will plan to discharge with instruction to go to comprehensive spine and return precaution     Risk  OTC drugs.  Prescription drug management.             Medications   lidocaine (LIDODERM) 5 % patch 1 patch (1 patch Topical Medication Applied 3/22/25 1358)   acetaminophen (TYLENOL) tablet 975 mg (975 mg Oral Given 3/22/25 1358)   ibuprofen (MOTRIN) tablet 600 mg (600 mg Oral Given 3/22/25 1358)       ED Risk Strat Scores                            SBIRT 22yo+      Flowsheet Row Most Recent Value   Initial Alcohol Screen: US AUDIT-C     1. How often do you have a drink containing alcohol? 0 Filed at: 03/22/2025 1346   2. How many drinks containing alcohol do you have on a typical day you are drinking?  0 Filed at: 03/22/2025 1346   3a. Male UNDER 65: How often do you have five or more drinks on one occasion? 0 Filed at: 03/22/2025 1346   3b. FEMALE Any Age, or MALE 65+: How often do you have 4 or more drinks  "on one occassion? 0 Filed at: 03/22/2025 1346   Audit-C Score 0 Filed at: 03/22/2025 1346   BARBARA: How many times in the past year have you...    Used an illegal drug or used a prescription medication for non-medical reasons? Never Filed at: 03/22/2025 1346                            History of Present Illness       Chief Complaint   Patient presents with    Joint Pain     Knees and wrists for \"awhile\" has not seen a PCP; taking ibuprofen        Past Medical History:   Diagnosis Date    Circulation problem     Hypertension     Stomach ulcer       Past Surgical History:   Procedure Laterality Date    CHOLECYSTECTOMY      GALLBLADDER SURGERY      HYSTERECTOMY      LEG SURGERY      states due to poor circulation    VARICOSE VEIN SURGERY        History reviewed. No pertinent family history.   Social History     Tobacco Use    Smoking status: Never     Passive exposure: Never    Smokeless tobacco: Never   Vaping Use    Vaping status: Never Used   Substance Use Topics    Alcohol use: Never    Drug use: Never      E-Cigarette/Vaping    E-Cigarette Use Never User       E-Cigarette/Vaping Substances      I have reviewed and agree with the history as documented.     HPI  69 year old female with knee pain, back pain and elbow pain. Patient reports that the symptoms have been ongoing for several years and it worsened the past couple months. Has not taken any medication. Patient reports no lower extremity weakness, numbness, saddle anesthesia, bowel/bladder intcontinence/retention. Patient without any recent trauma.     Review of Systems   Constitutional:  Negative for chills, diaphoresis, fever and unexpected weight change.   HENT:  Negative for ear pain and sore throat.    Eyes:  Negative for visual disturbance.   Respiratory:  Negative for cough, chest tightness and shortness of breath.    Cardiovascular:  Negative for chest pain and leg swelling.   Gastrointestinal:  Negative for abdominal distention, abdominal pain, " constipation, diarrhea, nausea and vomiting.   Endocrine: Negative.    Genitourinary:  Negative for difficulty urinating and dysuria.   Musculoskeletal:  Positive for arthralgias and back pain.   Skin: Negative.    Allergic/Immunologic: Negative.    Neurological: Negative.    Hematological: Negative.    Psychiatric/Behavioral: Negative.     All other systems reviewed and are negative.          Objective       ED Triage Vitals   Temperature Pulse Blood Pressure Respirations SpO2 Patient Position - Orthostatic VS   03/22/25 1425 03/22/25 1344 03/22/25 1344 03/22/25 1344 03/22/25 1344 03/22/25 1344   (!) 97.4 °F (36.3 °C) 87 124/65 16 98 % Sitting      Temp Source Heart Rate Source BP Location FiO2 (%) Pain Score    03/22/25 1425 03/22/25 1344 03/22/25 1344 -- 03/22/25 1358    Tympanic Monitor Left arm  6      Vitals      Date and Time Temp Pulse SpO2 Resp BP Pain Score FACES Pain Rating User   03/22/25 1425 97.4 °F (36.3 °C) -- -- -- -- -- -- MB   03/22/25 1358 -- -- -- -- -- 6 -- AM   03/22/25 1344 -- 87 98 % 16 124/65 -- -- KR            Physical Exam  Vitals and nursing note reviewed.   Constitutional:       General: She is not in acute distress.     Appearance: Normal appearance. She is not ill-appearing.   HENT:      Head: Normocephalic and atraumatic.      Right Ear: External ear normal.      Left Ear: External ear normal.      Nose: Nose normal.      Mouth/Throat:      Mouth: Mucous membranes are moist.      Pharynx: Oropharynx is clear.   Eyes:      General: No scleral icterus.        Right eye: No discharge.         Left eye: No discharge.      Extraocular Movements: Extraocular movements intact.      Conjunctiva/sclera: Conjunctivae normal.      Pupils: Pupils are equal, round, and reactive to light.   Cardiovascular:      Rate and Rhythm: Normal rate and regular rhythm.      Pulses: Normal pulses.      Heart sounds: Normal heart sounds.   Pulmonary:      Effort: Pulmonary effort is normal.      Breath  sounds: Normal breath sounds.   Abdominal:      General: Abdomen is flat. Bowel sounds are normal. There is no distension.      Palpations: Abdomen is soft.      Tenderness: There is no abdominal tenderness. There is no guarding or rebound.   Musculoskeletal:         General: Normal range of motion.      Cervical back: Normal range of motion and neck supple.   Skin:     General: Skin is warm and dry.      Capillary Refill: Capillary refill takes less than 2 seconds.   Neurological:      General: No focal deficit present.      Mental Status: She is alert and oriented to person, place, and time. Mental status is at baseline.   Psychiatric:         Mood and Affect: Mood normal.         Behavior: Behavior normal.         Thought Content: Thought content normal.         Judgment: Judgment normal.         Results Reviewed       None            No orders to display       Procedures    ED Medication and Procedure Management   Prior to Admission Medications   Prescriptions Last Dose Informant Patient Reported? Taking?   acetaminophen (TYLENOL) 650 mg CR tablet   No No   Sig: Take 1 tablet (650 mg total) by mouth every 8 (eight) hours as needed for mild pain   amLODIPine-valsartan (EXFORGE)  MG per tablet   Yes No   Sig: Take 1 tablet by mouth daily   ammonium lactate (LAC-HYDRIN) 12 % cream   No No   Sig: Apply topically as needed for dry skin   colchicine (COLCRYS) 0.6 mg tablet   No No   Sig: Take 1 tablet (0.6 mg total) by mouth as needed for muscle/joint pain for up to 4 days Can take 2 tablets initially and then 1 tablet one hour later if needed for pain.   menthol-cetylpyridinium (CEPACOL) 3 MG lozenge   No No   Sig: Take 1 lozenge (3 mg total) by mouth as needed for sore throat   pantoprazole (PROTONIX) 20 mg tablet   Yes No   Sig: Take 20 mg by mouth daily   sodium chloride (OCEAN) 0.65 % nasal spray   No No   Si spray into each nostril as needed for congestion (as needed for congestion)       Facility-Administered Medications: None     Discharge Medication List as of 3/22/2025  2:33 PM        CONTINUE these medications which have NOT CHANGED    Details   acetaminophen (TYLENOL) 650 mg CR tablet Take 1 tablet (650 mg total) by mouth every 8 (eight) hours as needed for mild pain, Starting Tue 12/19/2023, Normal      amLODIPine-valsartan (EXFORGE)  MG per tablet Take 1 tablet by mouth daily, Starting Fri 12/2/2022, Until Sat 12/2/2023, Historical Med      ammonium lactate (LAC-HYDRIN) 12 % cream Apply topically as needed for dry skin, Starting Tue 7/9/2024, Normal      colchicine (COLCRYS) 0.6 mg tablet Take 1 tablet (0.6 mg total) by mouth as needed for muscle/joint pain for up to 4 days Can take 2 tablets initially and then 1 tablet one hour later if needed for pain., Starting Thu 12/28/2023, Until Mon 1/1/2024 at 2359, Print      menthol-cetylpyridinium (CEPACOL) 3 MG lozenge Take 1 lozenge (3 mg total) by mouth as needed for sore throat, Starting Tue 12/19/2023, Normal      pantoprazole (PROTONIX) 20 mg tablet Take 20 mg by mouth daily, Starting Fri 12/2/2022, Until Sat 12/2/2023, Historical Med      sodium chloride (OCEAN) 0.65 % nasal spray 1 spray into each nostril as needed for congestion (as needed for congestion), Starting Tue 12/19/2023, Until Wed 12/18/2024 at 2359, Normal           No discharge procedures on file.  ED SEPSIS DOCUMENTATION   Time reflects when diagnosis was documented in both MDM as applicable and the Disposition within this note       Time User Action Codes Description Comment    3/22/2025  2:32 PM Radhames Mendieta [M25.50] Joint pain     3/22/2025  2:32 PM Radhames Mendieta [M54.50,  G89.29] Acute exacerbation of chronic low back pain                  Radhames Mendieta MD  03/22/25 8607

## 2025-03-24 ENCOUNTER — NURSE TRIAGE (OUTPATIENT)
Dept: PHYSICAL THERAPY | Facility: OTHER | Age: 70
End: 2025-03-24

## 2025-03-24 NOTE — TELEPHONE ENCOUNTER
[facility] #[id number]    Attempted to call the patient with IS to complete the triage process started today @ 1:40 pm.   states the call will not connect. Attempt x3.    Referral Closed.  Triage will be completed when a call back is received.

## 2025-03-24 NOTE — TELEPHONE ENCOUNTER
"Patient's daughter in law Lucy called into [facility] today 03/24 and left v/m @1:11pm regarding patient's ED visit due to back pain. Lucy is not listed on communication consent.    Returned Lucy's call. Spoke with her, explained program and protocol. She was with patient at the time of the call and I made her aware I needed to speak with patient directly if interested in triage.    Romanian speaking. Needs IS.    Additional Information   Negative: Is this related to a work injury?   Negative: Is this related to an MVA?   Negative: Are you currently recieving homecare services?    Background - Initial Assessment  Clinical complaint: ED visit on 03/22 due to Middle-Lower Back Pain that started almost \"3 months ago\". This is a new flare up. Hx of mid-back pain 7-8 years ago. Patient states pain radiates down int the SI joint and right leg \"I have an ulcer in right leg\". Numbness and tingling sensation in right leg and \"sometimes\" b/l hand mostly on the left hand. NKI. Pain is constant and at times intermittent. Worse at night and when getting up or sitting, standing for too long. Not seeing a Dr for this pain. Patient described it as pulling, sharp, aching, burning.  Date of onset: almost 3 months (new flare up)  Frequency of pain: constant  Quality of pain: aching, burning, numb, sharp, pulling and tingling.    Protocols used: [facility] Protocol    "